# Patient Record
Sex: MALE | Race: WHITE | Employment: UNEMPLOYED | URBAN - METROPOLITAN AREA
[De-identification: names, ages, dates, MRNs, and addresses within clinical notes are randomized per-mention and may not be internally consistent; named-entity substitution may affect disease eponyms.]

---

## 2023-09-15 ENCOUNTER — TELEPHONE (OUTPATIENT)
Dept: NEPHROLOGY | Facility: CLINIC | Age: 63
End: 2023-09-15
Payer: MEDICARE

## 2023-09-15 DIAGNOSIS — N18.30 STAGE 3 CHRONIC KIDNEY DISEASE, UNSPECIFIED WHETHER STAGE 3A OR 3B CKD: Primary | ICD-10-CM

## 2023-09-15 NOTE — TELEPHONE ENCOUNTER
L/m that our next avail appt is in Dec. With labs the week before.asked to call back to schedule labs.9/15/23/sf

## 2023-09-15 NOTE — TELEPHONE ENCOUNTER
----- Message from Tamanna Steel MA sent at 9/15/2023  9:53 AM CDT -----  Contact: pt  Patient looking to get schedule for a new patient appointment, please give him a all back at [phone] (home) [phone] (work)      Thanks   Sj

## 2023-11-13 ENCOUNTER — TELEPHONE (OUTPATIENT)
Dept: NEPHROLOGY | Facility: CLINIC | Age: 63
End: 2023-11-13
Payer: MEDICARE

## 2023-11-13 NOTE — TELEPHONE ENCOUNTER
L/m that his appt had to be changed if he wants to move to nov he may.please call to schedule. 11/13/23/sf

## 2023-11-14 ENCOUNTER — TELEPHONE (OUTPATIENT)
Dept: NEPHROLOGY | Facility: CLINIC | Age: 63
End: 2023-11-14
Payer: MEDICARE

## 2023-11-14 NOTE — TELEPHONE ENCOUNTER
----- Message from Jacinta Washington sent at 11/14/2023  4:25 PM CST -----  Contact: Duane  .Type:  Patient Returning Call    Who Called: Duane   Who Left Message for Patient: Estela  Does the patient know what this is regarding?: Pt wants to cancel upcoming appt   Would the patient rather a call back or a response via [facility]?  Call   Best Call Back Number: .[phone]   Additional Information: